# Patient Record
Sex: FEMALE | Race: WHITE | NOT HISPANIC OR LATINO
[De-identification: names, ages, dates, MRNs, and addresses within clinical notes are randomized per-mention and may not be internally consistent; named-entity substitution may affect disease eponyms.]

---

## 2021-10-04 PROBLEM — Z00.00 ENCOUNTER FOR PREVENTIVE HEALTH EXAMINATION: Status: ACTIVE | Noted: 2021-10-04

## 2021-10-08 ENCOUNTER — NON-APPOINTMENT (OUTPATIENT)
Age: 41
End: 2021-10-08

## 2021-10-11 ENCOUNTER — APPOINTMENT (OUTPATIENT)
Dept: BREAST CENTER | Facility: CLINIC | Age: 41
End: 2021-10-11
Payer: COMMERCIAL

## 2021-10-11 VITALS — WEIGHT: 150 LBS | HEIGHT: 62.5 IN | BODY MASS INDEX: 26.91 KG/M2

## 2021-10-11 DIAGNOSIS — Z15.01 GENETIC SUSCEPTIBILITY TO MALIGNANT NEOPLASM OF BREAST: ICD-10-CM

## 2021-10-11 DIAGNOSIS — Z80.1 FAMILY HISTORY OF MALIGNANT NEOPLASM OF TRACHEA, BRONCHUS AND LUNG: ICD-10-CM

## 2021-10-11 DIAGNOSIS — Z80.41 FAMILY HISTORY OF MALIGNANT NEOPLASM OF OVARY: ICD-10-CM

## 2021-10-11 DIAGNOSIS — Z78.9 OTHER SPECIFIED HEALTH STATUS: ICD-10-CM

## 2021-10-11 DIAGNOSIS — Z95.1 PRESENCE OF AORTOCORONARY BYPASS GRAFT: ICD-10-CM

## 2021-10-11 DIAGNOSIS — Z15.09 GENETIC SUSCEPTIBILITY TO MALIGNANT NEOPLASM OF BREAST: ICD-10-CM

## 2021-10-11 DIAGNOSIS — R92.8 OTHER ABNORMAL AND INCONCLUSIVE FINDINGS ON DIAGNOSTIC IMAGING OF BREAST: ICD-10-CM

## 2021-10-11 PROCEDURE — 99203 OFFICE O/P NEW LOW 30 MIN: CPT

## 2021-10-11 RX ORDER — ESCITALOPRAM OXALATE 10 MG/1
10 TABLET, FILM COATED ORAL
Refills: 0 | Status: ACTIVE | COMMUNITY

## 2021-10-11 RX ORDER — SPIRONOLACTONE 50 MG/1
TABLET ORAL
Refills: 0 | Status: ACTIVE | COMMUNITY

## 2021-10-11 RX ORDER — PROGESTERONE 50 MG/ML
INJECTION, SOLUTION INTRAMUSCULAR
Refills: 0 | Status: ACTIVE | COMMUNITY

## 2021-10-11 RX ORDER — ESTRADIOL VALERATE 40 MG/ML
INJECTION INTRAMUSCULAR
Refills: 0 | Status: ACTIVE | COMMUNITY

## 2021-10-11 RX ORDER — CLONAZEPAM 1 MG/1
1 TABLET ORAL
Refills: 0 | Status: ACTIVE | COMMUNITY

## 2021-11-12 ENCOUNTER — NON-APPOINTMENT (OUTPATIENT)
Age: 41
End: 2021-11-12

## 2021-12-09 ENCOUNTER — NON-APPOINTMENT (OUTPATIENT)
Age: 41
End: 2021-12-09

## 2021-12-13 ENCOUNTER — NON-APPOINTMENT (OUTPATIENT)
Age: 41
End: 2021-12-13

## 2021-12-13 ENCOUNTER — APPOINTMENT (OUTPATIENT)
Dept: BREAST CENTER | Facility: CLINIC | Age: 41
End: 2021-12-13

## 2022-01-20 ENCOUNTER — APPOINTMENT (OUTPATIENT)
Dept: BREAST CENTER | Facility: CLINIC | Age: 42
End: 2022-01-20
Payer: COMMERCIAL

## 2022-01-20 PROCEDURE — 99213 OFFICE O/P EST LOW 20 MIN: CPT | Mod: 95

## 2022-01-20 NOTE — PAST MEDICAL HISTORY
[Menstruating] : The patient is menstruating [Menarche Age ____] : age at menarche was [unfilled] [History of Hormone Replacement Treatment] : has a history of hormone replacement treatment [Total Preg ___] : G[unfilled] [Live Births ___] : P[unfilled]  [FreeTextEntry5] : 2018; prophylactic salpingo-oophorectomy [FreeTextEntry6] : None [FreeTextEntry7] : None [FreeTextEntry8] : Yes, the 2nd child

## 2022-01-20 NOTE — DATA REVIEWED
[FreeTextEntry1] : 5/20/21 ([facility]) MRI breasts: Left breast 2:00 rec MRI biopsy. Left 10::00, rec 6-month f/u MRI if above biopsy is benign. Right 11:00 0.6cm mass, probably benign, rec 6-month f/u MRI to confirm stability. BI-RADS 4. \par \par 5/27/21 ([facility]) MRI biopsy left breast 2n7: path revealed duct ectasia, perilobular inflammation, benign and concordant. Rec 6-month f/u MRI for post biopsy follow up and for f/u of additional findings as per MRI report dated 5/20/21

## 2022-01-20 NOTE — DATA REVIEWED
[FreeTextEntry1] : 5/20/21 ([facility]) MRI breasts: Left breast 2:00 rec MRI biopsy. Left 10::00, rec 6-month f/u MRI if above biopsy is benign. Right 11:00 0.6cm mass, probably benign, rec 6-month f/u MRI to confirm stability. BI-RADS 4. \par \par 5/27/21 ([facility]) MRI biopsy left breast 2n7: path revealed duct ectasia, perilobular inflammation, benign and concordant. Rec 6-month f/u MRI for post biopsy follow up and for f/u of additional findings as per MRI report dated 5/20/21\par \par Left retroareolar FNA-showed debris at [facility],as per patient

## 2022-01-20 NOTE — HISTORY OF PRESENT ILLNESS
[FreeTextEntry1] : 40 yo BRCA1+ female referred by Dr. Hanson to discuss prophylactic mastectomy; she is planning on a b/l ARIELLA flap. The patient found our that she was BRCA1+ in 2008, after her mother was diagnosed with ovarian cancer and tested +for BRCA1. The patient is s/p b/l salpingo-oophorectomy in 2018. She is followed by Dr. Lawson (med onc), who manages her imaging and screening. Pt denies palpable masses, skin lesions/changes, nipple discharge, or other breast complaints.\par \par Patient ruptured multiple ligaments after a climbing wall accident and has had surgery. Breast surgery will be postponed until she's healed.\par \par History of a bicuspid aortic valve; followed by cardiology (Dr. Ruiz at [facility])\par \par

## 2022-01-20 NOTE — HISTORY OF PRESENT ILLNESS
[FreeTextEntry1] : 40 yo BRCA1+ female referred by Dr. Hanson to discuss prophylactic mastectomy; she is planning on a b/l ARIELLA flap. The patient found our that she was BRCA1+ in 2008, after her mother was diagnosed with ovarian cancer and tested +for BRCA1. The patient is s/p b/l salpingo-oophorectomy in 2018. She is followed by Dr. Lawson (med onc), who manages her imaging and screening. Pt denies palpable masses, skin lesions/changes, nipple discharge, or other breast complaints.\par \par She reports that she completed a MRI in May 2021 followed by a biopsy with benign results; states that her last screening mmg was in October 2020. \par \par History of a bicuspid aortic valve; followed by cardiology (Dr. Ruiz at [facility])\par \par Discussed risk reducing options. This includes medical oncology consult and possible medication that would lower risk by 50%. As well as risk reducing surgery, discussed the risks and benefits of risk reducing breast surgery including the decrease of breast cancer risk to ~3-5%, length of recover and wound healing complications. Patient understands and would like to move forward with bilateral mastectomy and ARIELLA flap recon by Dr. Hanson in Jan 2022.\par \par

## 2022-05-10 ENCOUNTER — APPOINTMENT (OUTPATIENT)
Dept: BREAST CENTER | Facility: CLINIC | Age: 42
End: 2022-05-10

## 2022-05-25 ENCOUNTER — NON-APPOINTMENT (OUTPATIENT)
Age: 42
End: 2022-05-25

## 2022-06-20 ENCOUNTER — NON-APPOINTMENT (OUTPATIENT)
Age: 42
End: 2022-06-20

## 2022-07-06 ENCOUNTER — APPOINTMENT (OUTPATIENT)
Dept: BREAST CENTER | Facility: CLINIC | Age: 42
End: 2022-07-06

## 2022-07-06 VITALS
WEIGHT: 185.13 LBS | SYSTOLIC BLOOD PRESSURE: 133 MMHG | DIASTOLIC BLOOD PRESSURE: 85 MMHG | HEIGHT: 62.5 IN | HEART RATE: 67 BPM | BODY MASS INDEX: 33.21 KG/M2

## 2022-07-06 DIAGNOSIS — Z87.74 PERSONAL HISTORY OF (CORRECTED) CONGENITAL MALFORMATIONS OF HEART AND CIRCULATORY SYSTEM: ICD-10-CM

## 2022-07-06 PROCEDURE — 99213 OFFICE O/P EST LOW 20 MIN: CPT

## 2022-07-11 NOTE — DATA REVIEWED
[FreeTextEntry1] : 5/20/21 ([facility]) MRI breasts: Left breast 2:00 rec MRI biopsy. Left 10::00, rec 6-month f/u MRI if above biopsy is benign. Right 11:00 0.6cm mass, probably benign, rec 6-month f/u MRI to confirm stability. BI-RADS 4. \par \par 5/27/21 ([facility]) MRI biopsy left breast 2n7: path revealed duct ectasia, perilobular inflammation, benign and concordant. Rec 6-month f/u MRI for post biopsy follow up and for f/u of additional findings as per MRI report dated 5/20/21\par \par 11/1/2021 ([facility]) bilateral mammogram/US showing scattered areas of fibroglandular density, two clips left breast, no mammographic evidence of malignancy. Bilateral breast US: possible intraductal filling defect right retroareolar region for which targeted US recommended. BIRADS 0 \par \par Left retroareolar FNA-showed debris at [facility],as per patient\par \par 5/20/2022 ([facility]) bilateral breast MRI showing no MRI evidence of breast malignancy. Benign BIRADS 2

## 2022-07-11 NOTE — PAST MEDICAL HISTORY
[Menarche Age ____] : age at menarche was [unfilled] [History of Hormone Replacement Treatment] : has a history of hormone replacement treatment [Total Preg ___] : G[unfilled] [Live Births ___] : P[unfilled]  [Age At Live Birth ___] : Age at live birth: [unfilled] [FreeTextEntry5] : 2018; prophylactic salpingo-oophorectomy [FreeTextEntry6] : None [FreeTextEntry7] : None [FreeTextEntry8] : Yes, the 2nd child

## 2022-07-11 NOTE — HISTORY OF PRESENT ILLNESS
[FreeTextEntry1] : 41 yo BRCA1+ female presents for high risk screening. The patient found our that she was BRCA1+ in 2008, after her mother was diagnosed with ovarian cancer and tested +for BRCA1. The patient is s/p b/l salpingo-oophorectomy in 2018. She is followed by Dr. Lawson (med onc), who manages her imaging and screening. Pt denies palpable masses, skin lesions/changes, nipple discharge, or other breast complaints. She is interested in prophylactic mastectomy with ARIELLA flap reconstruction; she had previously consulted with Dr. Hanson.\par \par Patient ruptured multiple ligaments after a climbing wall accident and has had surgery. Her mobility is improving, she is undergoing physical therapy and has recently started to be able to run. Breast surgery will be postponed until she's healed.\par \par History of a bicuspid aortic valve; followed by cardiology (Dr. Ruiz at [facility])

## 2022-11-29 ENCOUNTER — APPOINTMENT (OUTPATIENT)
Dept: BREAST CENTER | Facility: CLINIC | Age: 42
End: 2022-11-29

## 2023-04-12 ENCOUNTER — TRANSCRIPTION ENCOUNTER (OUTPATIENT)
Age: 43
End: 2023-04-12

## 2023-04-12 VITALS
TEMPERATURE: 97 F | WEIGHT: 187.61 LBS | HEIGHT: 63 IN | SYSTOLIC BLOOD PRESSURE: 142 MMHG | RESPIRATION RATE: 16 BRPM | DIASTOLIC BLOOD PRESSURE: 85 MMHG | HEART RATE: 64 BPM | OXYGEN SATURATION: 100 %

## 2023-04-12 NOTE — PATIENT PROFILE ADULT - FALL HARM RISK - UNIVERSAL INTERVENTIONS
Bed in lowest position, wheels locked, appropriate side rails in place/Call bell, personal items and telephone in reach/Instruct patient to call for assistance before getting out of bed or chair/Non-slip footwear when patient is out of bed/[place] to call system/Physically safe environment - no spills, clutter or unnecessary equipment/Purposeful Proactive Rounding/Room/bathroom lighting operational, light cord in reach

## 2023-04-12 NOTE — PRE-OP CHECKLIST - PATIENT'S PERSONAL PROPERTY GIVEN TO
family member/on unit 2 bags sda, valuables w family/family member/on unit 2 bags sda, passport w marilyn; phone w security/family member/on unit/security/safe

## 2023-04-13 ENCOUNTER — TRANSCRIPTION ENCOUNTER (OUTPATIENT)
Age: 43
End: 2023-04-13

## 2023-04-13 ENCOUNTER — INPATIENT (INPATIENT)
Facility: HOSPITAL | Age: 43
LOS: 2 days | Discharge: HOME | DRG: 581 | End: 2023-04-16
Attending: PLASTIC SURGERY | Admitting: PLASTIC SURGERY
Payer: COMMERCIAL

## 2023-04-13 DIAGNOSIS — Z41.9 ENCOUNTER FOR PROCEDURE FOR PURPOSES OTHER THAN REMEDYING HEALTH STATE, UNSPECIFIED: Chronic | ICD-10-CM

## 2023-04-13 DIAGNOSIS — Z98.890 OTHER SPECIFIED POSTPROCEDURAL STATES: Chronic | ICD-10-CM

## 2023-04-13 DIAGNOSIS — Z98.891 HISTORY OF UTERINE SCAR FROM PREVIOUS SURGERY: Chronic | ICD-10-CM

## 2023-04-13 LAB
ANION GAP SERPL CALC-SCNC: 8 MMOL/L — SIGNIFICANT CHANGE UP (ref 5–17)
APTT BLD: 35.7 SEC — HIGH (ref 27.5–35.5)
BUN SERPL-MCNC: 16 MG/DL — SIGNIFICANT CHANGE UP (ref 7–23)
CALCIUM SERPL-MCNC: 9.2 MG/DL — SIGNIFICANT CHANGE UP (ref 8.4–10.5)
CHLORIDE SERPL-SCNC: 103 MMOL/L — SIGNIFICANT CHANGE UP (ref 96–108)
CO2 SERPL-SCNC: 26 MMOL/L — SIGNIFICANT CHANGE UP (ref 22–31)
CREAT SERPL-MCNC: 1.02 MG/DL — SIGNIFICANT CHANGE UP (ref 0.5–1.3)
EGFR: 71 ML/MIN/1.73M2 — SIGNIFICANT CHANGE UP
GLUCOSE SERPL-MCNC: 123 MG/DL — HIGH (ref 70–99)
HCT VFR BLD CALC: 41.5 % — SIGNIFICANT CHANGE UP (ref 34.5–45)
HGB BLD-MCNC: 13.7 G/DL — SIGNIFICANT CHANGE UP (ref 11.5–15.5)
INR BLD: 0.97 — SIGNIFICANT CHANGE UP (ref 0.88–1.16)
MCHC RBC-ENTMCNC: 30.3 PG — SIGNIFICANT CHANGE UP (ref 27–34)
MCHC RBC-ENTMCNC: 33 GM/DL — SIGNIFICANT CHANGE UP (ref 32–36)
MCV RBC AUTO: 91.8 FL — SIGNIFICANT CHANGE UP (ref 80–100)
NRBC # BLD: 0 /100 WBCS — SIGNIFICANT CHANGE UP (ref 0–0)
PLATELET # BLD AUTO: 256 K/UL — SIGNIFICANT CHANGE UP (ref 150–400)
POTASSIUM SERPL-MCNC: 4.4 MMOL/L — SIGNIFICANT CHANGE UP (ref 3.5–5.3)
POTASSIUM SERPL-SCNC: 4.4 MMOL/L — SIGNIFICANT CHANGE UP (ref 3.5–5.3)
PROTHROM AB SERPL-ACNC: 11.5 SEC — SIGNIFICANT CHANGE UP (ref 10.5–13.4)
RBC # BLD: 4.52 M/UL — SIGNIFICANT CHANGE UP (ref 3.8–5.2)
RBC # FLD: 12.4 % — SIGNIFICANT CHANGE UP (ref 10.3–14.5)
SODIUM SERPL-SCNC: 137 MMOL/L — SIGNIFICANT CHANGE UP (ref 135–145)
WBC # BLD: 7.42 K/UL — SIGNIFICANT CHANGE UP (ref 3.8–10.5)
WBC # FLD AUTO: 7.42 K/UL — SIGNIFICANT CHANGE UP (ref 3.8–10.5)

## 2023-04-13 PROCEDURE — 64912 NRV RPR W/NRV ALGRFT 1ST: CPT | Mod: RT

## 2023-04-13 PROCEDURE — S2068: CPT | Mod: RT

## 2023-04-13 PROCEDURE — 88305 TISSUE EXAM BY PATHOLOGIST: CPT | Mod: 26

## 2023-04-13 PROCEDURE — 88307 TISSUE EXAM BY PATHOLOGIST: CPT | Mod: 26

## 2023-04-13 PROCEDURE — 88300 SURGICAL PATH GROSS: CPT | Mod: 26,59

## 2023-04-13 PROCEDURE — 38530 BIOPSY/REMOVAL LYMPH NODES: CPT | Mod: 50,80

## 2023-04-13 PROCEDURE — 15777 ACELLULAR DERM MATRIX IMPLT: CPT | Mod: RT

## 2023-04-13 PROCEDURE — 21600 PARTIAL REMOVAL OF RIB: CPT | Mod: 80,LT,59

## 2023-04-13 DEVICE — MESH PHASIX ST 10X15CM: Type: IMPLANTABLE DEVICE | Site: BILATERAL | Status: FUNCTIONAL

## 2023-04-13 DEVICE — COUPLER VESSEL ANASTOMOTIC 2.5MM: Type: IMPLANTABLE DEVICE | Site: BILATERAL | Status: FUNCTIONAL

## 2023-04-13 DEVICE — CARTRIDGE MICROCLIP 30: Type: IMPLANTABLE DEVICE | Site: BILATERAL | Status: FUNCTIONAL

## 2023-04-13 DEVICE — CLIP APPLIER ETHICON LIGACLIP 9 3/8" MEDIUM: Type: IMPLANTABLE DEVICE | Site: BILATERAL | Status: FUNCTIONAL

## 2023-04-13 DEVICE — CLIP APPLIER ETHICON LIGACLIP 11.5" MEDIUM: Type: IMPLANTABLE DEVICE | Site: BILATERAL | Status: FUNCTIONAL

## 2023-04-13 DEVICE — CLIP APPLIER ETHICON LIGACLIP 9 3/8" SMALL: Type: IMPLANTABLE DEVICE | Site: BILATERAL | Status: FUNCTIONAL

## 2023-04-13 DEVICE — DOPPLER PROBE DISPOSABLE: Type: IMPLANTABLE DEVICE | Site: BILATERAL | Status: FUNCTIONAL

## 2023-04-13 DEVICE — LIGATING CLIPS SYNOVIS SUPERFINE MICROCLIP 6: Type: IMPLANTABLE DEVICE | Site: BILATERAL | Status: FUNCTIONAL

## 2023-04-13 DEVICE — GRAFT NERVE CONNECTOR 2X10MM: Type: IMPLANTABLE DEVICE | Site: BILATERAL | Status: FUNCTIONAL

## 2023-04-13 DEVICE — COUPLER VESSEL ANASTOMOTIC 3MM: Type: IMPLANTABLE DEVICE | Site: BILATERAL | Status: FUNCTIONAL

## 2023-04-13 RX ORDER — HYDROMORPHONE HYDROCHLORIDE 2 MG/ML
0.5 INJECTION INTRAMUSCULAR; INTRAVENOUS; SUBCUTANEOUS
Refills: 0 | Status: DISCONTINUED | OUTPATIENT
Start: 2023-04-13 | End: 2023-04-16

## 2023-04-13 RX ORDER — OXYCODONE HYDROCHLORIDE 5 MG/1
10 TABLET ORAL EVERY 4 HOURS
Refills: 0 | Status: DISCONTINUED | OUTPATIENT
Start: 2023-04-13 | End: 2023-04-16

## 2023-04-13 RX ORDER — ENOXAPARIN SODIUM 100 MG/ML
40 INJECTION SUBCUTANEOUS EVERY 24 HOURS
Refills: 0 | Status: DISCONTINUED | OUTPATIENT
Start: 2023-04-14 | End: 2023-04-16

## 2023-04-13 RX ORDER — SPIRONOLACTONE 25 MG/1
1 TABLET, FILM COATED ORAL
Refills: 0 | DISCHARGE

## 2023-04-13 RX ORDER — LAMOTRIGINE 25 MG/1
1 TABLET, ORALLY DISINTEGRATING ORAL
Refills: 0 | DISCHARGE

## 2023-04-13 RX ORDER — ESCITALOPRAM OXALATE 10 MG/1
1 TABLET, FILM COATED ORAL
Refills: 0 | DISCHARGE

## 2023-04-13 RX ORDER — ENOXAPARIN SODIUM 100 MG/ML
40 INJECTION SUBCUTANEOUS ONCE
Refills: 0 | Status: COMPLETED | OUTPATIENT
Start: 2023-04-13 | End: 2023-04-13

## 2023-04-13 RX ORDER — HYDROMORPHONE HYDROCHLORIDE 2 MG/ML
1 INJECTION INTRAMUSCULAR; INTRAVENOUS; SUBCUTANEOUS EVERY 4 HOURS
Refills: 0 | Status: DISCONTINUED | OUTPATIENT
Start: 2023-04-13 | End: 2023-04-16

## 2023-04-13 RX ORDER — SODIUM CHLORIDE 9 MG/ML
1000 INJECTION, SOLUTION INTRAVENOUS
Refills: 0 | Status: DISCONTINUED | OUTPATIENT
Start: 2023-04-13 | End: 2023-04-14

## 2023-04-13 RX ORDER — OXYCODONE HYDROCHLORIDE 5 MG/1
5 TABLET ORAL EVERY 4 HOURS
Refills: 0 | Status: DISCONTINUED | OUTPATIENT
Start: 2023-04-13 | End: 2023-04-16

## 2023-04-13 RX ORDER — DIAZEPAM 5 MG
5 TABLET ORAL EVERY 8 HOURS
Refills: 0 | Status: DISCONTINUED | OUTPATIENT
Start: 2023-04-13 | End: 2023-04-16

## 2023-04-13 RX ORDER — ACETAMINOPHEN 500 MG
975 TABLET ORAL EVERY 8 HOURS
Refills: 0 | Status: DISCONTINUED | OUTPATIENT
Start: 2023-04-13 | End: 2023-04-16

## 2023-04-13 RX ORDER — SENNA PLUS 8.6 MG/1
2 TABLET ORAL AT BEDTIME
Refills: 0 | Status: DISCONTINUED | OUTPATIENT
Start: 2023-04-13 | End: 2023-04-16

## 2023-04-13 RX ORDER — METOCLOPRAMIDE HCL 10 MG
10 TABLET ORAL EVERY 6 HOURS
Refills: 0 | Status: DISCONTINUED | OUTPATIENT
Start: 2023-04-13 | End: 2023-04-16

## 2023-04-13 RX ORDER — ONDANSETRON 8 MG/1
4 TABLET, FILM COATED ORAL EVERY 6 HOURS
Refills: 0 | Status: DISCONTINUED | OUTPATIENT
Start: 2023-04-13 | End: 2023-04-16

## 2023-04-13 RX ADMIN — ENOXAPARIN SODIUM 40 MILLIGRAM(S): 100 INJECTION SUBCUTANEOUS at 07:07

## 2023-04-13 RX ADMIN — Medication 975 MILLIGRAM(S): at 22:18

## 2023-04-13 RX ADMIN — Medication 100 MILLIGRAM(S): at 19:43

## 2023-04-13 NOTE — PROGRESS NOTE ADULT - SUBJECTIVE AND OBJECTIVE BOX
Team 5 Surgery Post op Check    Procedure: Mastectomy, bilateral, skin sparing  Mastectomy, with reconstruction using ARIELLA free flap  Surgeon: Dr. Dorantes and Dr. Yan    Subjective:  Patient seen and examined at bedside in PACU. Vital signs reviewed and noted to be stable. Patient reports she feels tired after surgery, but pain is controlled. Denies fevers, chills, chest pain, SOB, nausea, emesis. States she does have some pain in abdomen.     Vital Signs Last 24 Hrs  T(C): 36.4 (13 Apr 2023 17:28), Max: 36.4 (13 Apr 2023 17:28)  T(F): 97.6 (13 Apr 2023 17:28), Max: 97.6 (13 Apr 2023 17:28)  HR: 82 (13 Apr 2023 18:43) (64 - 82)  BP: 119/56 (13 Apr 2023 18:43) (100/55 - 142/85)  BP(mean): 81 (13 Apr 2023 18:43) (75 - 81)  RR: 14 (13 Apr 2023 18:43) (12 - 16)  SpO2: 100% (13 Apr 2023 18:43) (96% - 100%)    Parameters below as of 13 Apr 2023 18:43  Patient On (Oxygen Delivery Method): nasal cannula        Physical Exam:  General: NAD, resting comfortably in bed  Pulmonary: Nonlabored breathing, no respiratory distress, saturating well on NC  Cardiovascular: NSR  Breast: compressive bra in place, breast soft, no evidence of hematoma. JUAN ANTONIO x 4 noted to be SS. Doppler signals present  Abdominal: soft, nondistended, lower abdominal incision CDI with steri strips. Moderate tenderness to palpation  Extremities: WWP, normal strength, No edema  Neuro: A/O x 3, CNs II-XII grossly intact, no focal deficits, normal motor/sensation      LABS:                        13.7   7.42  )-----------( 256      ( 13 Apr 2023 07:38 )             41.5     04-13    137  |  103  |  16  ----------------------------<  123<H>  4.4   |  26  |  1.02    Ca    9.2      13 Apr 2023 07:38      PT/INR - ( 13 Apr 2023 07:38 )   PT: 11.5 sec;   INR: 0.97          PTT - ( 13 Apr 2023 07:38 )  PTT:35.7 sec  CAPILLARY BLOOD GLUCOSE            ABO Interpretation: A (04-13 @ 08:13)        Radiology and Additional Studies:

## 2023-04-13 NOTE — PRE-ANESTHESIA EVALUATION ADULT - NSANTHPEFT_GEN_ALL_CORE
Alert and Oriented X 3 in no acute distress.  Heart: RRR  Abd: Soft NDNT  Extremities: gross motor function intact

## 2023-04-13 NOTE — BRIEF OPERATIVE NOTE - NSICDXBRIEFPOSTOP_GEN_ALL_CORE_FT
POST-OP DIAGNOSIS:  BRCA1 positive 13-Apr-2023 11:12:10  Danial Bledsoe  
POST-OP DIAGNOSIS:  BRCA1 positive 13-Apr-2023 11:12:10  Danial Bledsoe

## 2023-04-13 NOTE — BRIEF OPERATIVE NOTE - OPERATION/FINDINGS
Periareolar nipple and vertical inferior incision made with 15blade. Flaps raised superiorly to clavicle, medially to sternum, inferiorly to IMF, laterally to border of latissimus dorsi, and posteriorly to pec major fascia using electrocautery and sharp dissection. Breast tissue removed and marked. ARIELLA flap reconstruction to continue per plastics surgery.
bilateral breast reconstruction with ARIELLA flaps, nerve grafts, abdominal repair with mesh.

## 2023-04-13 NOTE — PRE-ANESTHESIA EVALUATION ADULT - NSANTHPMHFT_GEN_ALL_CORE
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  Pt follows cardiologist. Denies any chest pain, shortness of breath, or edema.

## 2023-04-13 NOTE — PROGRESS NOTE ADULT - ASSESSMENT
Assessment:42y Female s/p above procedure    Plan:  NPO w/ sips  Pain/nausea control PRN  Home meds  Monitor JUAN ANTONIO output  Monitor doppler  Rest of care per Plastics

## 2023-04-13 NOTE — BRIEF OPERATIVE NOTE - NSICDXBRIEFPREOP_GEN_ALL_CORE_FT
PRE-OP DIAGNOSIS:  BRCA1 positive 13-Apr-2023 11:11:57  Danial Bledsoe  
PRE-OP DIAGNOSIS:  BRCA1 positive 13-Apr-2023 11:11:57  Danial Bledsoe

## 2023-04-13 NOTE — BRIEF OPERATIVE NOTE - NSICDXBRIEFPROCEDURE_GEN_ALL_CORE_FT
PROCEDURES:  Mastectomy, with reconstruction using ARIELLA free flap 13-Apr-2023 11:11:21  Danial Bledsoe  
PROCEDURES:  Mastectomy, with reconstruction using ARIELLA free flap 13-Apr-2023 11:11:21  Danial Bledsoe

## 2023-04-13 NOTE — PRE-ANESTHESIA EVALUATION ADULT - NSPROPOSEDPROCEDFT_GEN_ALL_CORE
Bilateral Skin Sparing Prophylactic Mastectomy with Immediate Bilateral Autologous ARIELLA Reconstruction

## 2023-04-14 ENCOUNTER — TRANSCRIPTION ENCOUNTER (OUTPATIENT)
Age: 43
End: 2023-04-14

## 2023-04-14 LAB
ANION GAP SERPL CALC-SCNC: 9 MMOL/L — SIGNIFICANT CHANGE UP (ref 5–17)
BUN SERPL-MCNC: 12 MG/DL — SIGNIFICANT CHANGE UP (ref 7–23)
CALCIUM SERPL-MCNC: 8.2 MG/DL — LOW (ref 8.4–10.5)
CHLORIDE SERPL-SCNC: 102 MMOL/L — SIGNIFICANT CHANGE UP (ref 96–108)
CO2 SERPL-SCNC: 25 MMOL/L — SIGNIFICANT CHANGE UP (ref 22–31)
CREAT SERPL-MCNC: 0.98 MG/DL — SIGNIFICANT CHANGE UP (ref 0.5–1.3)
EGFR: 74 ML/MIN/1.73M2 — SIGNIFICANT CHANGE UP
GLUCOSE SERPL-MCNC: 136 MG/DL — HIGH (ref 70–99)
HCT VFR BLD CALC: 29.6 % — LOW (ref 34.5–45)
HGB BLD-MCNC: 9.7 G/DL — LOW (ref 11.5–15.5)
MCHC RBC-ENTMCNC: 30.7 PG — SIGNIFICANT CHANGE UP (ref 27–34)
MCHC RBC-ENTMCNC: 32.8 GM/DL — SIGNIFICANT CHANGE UP (ref 32–36)
MCV RBC AUTO: 93.7 FL — SIGNIFICANT CHANGE UP (ref 80–100)
NRBC # BLD: 0 /100 WBCS — SIGNIFICANT CHANGE UP (ref 0–0)
PLATELET # BLD AUTO: 269 K/UL — SIGNIFICANT CHANGE UP (ref 150–400)
POTASSIUM SERPL-MCNC: 4.2 MMOL/L — SIGNIFICANT CHANGE UP (ref 3.5–5.3)
POTASSIUM SERPL-SCNC: 4.2 MMOL/L — SIGNIFICANT CHANGE UP (ref 3.5–5.3)
RBC # BLD: 3.16 M/UL — LOW (ref 3.8–5.2)
RBC # FLD: 12.8 % — SIGNIFICANT CHANGE UP (ref 10.3–14.5)
SODIUM SERPL-SCNC: 136 MMOL/L — SIGNIFICANT CHANGE UP (ref 135–145)
WBC # BLD: 13.85 K/UL — HIGH (ref 3.8–10.5)
WBC # FLD AUTO: 13.85 K/UL — HIGH (ref 3.8–10.5)

## 2023-04-14 RX ORDER — SODIUM CHLORIDE 9 MG/ML
1000 INJECTION, SOLUTION INTRAVENOUS
Refills: 0 | Status: DISCONTINUED | OUTPATIENT
Start: 2023-04-14 | End: 2023-04-14

## 2023-04-14 RX ORDER — LAMOTRIGINE 25 MG/1
100 TABLET, ORALLY DISINTEGRATING ORAL AT BEDTIME
Refills: 0 | Status: DISCONTINUED | OUTPATIENT
Start: 2023-04-14 | End: 2023-04-16

## 2023-04-14 RX ORDER — ESCITALOPRAM OXALATE 10 MG/1
20 TABLET, FILM COATED ORAL DAILY
Refills: 0 | Status: DISCONTINUED | OUTPATIENT
Start: 2023-04-14 | End: 2023-04-16

## 2023-04-14 RX ORDER — SPIRONOLACTONE 25 MG/1
50 TABLET, FILM COATED ORAL DAILY
Refills: 0 | Status: DISCONTINUED | OUTPATIENT
Start: 2023-04-14 | End: 2023-04-16

## 2023-04-14 RX ADMIN — LAMOTRIGINE 100 MILLIGRAM(S): 25 TABLET, ORALLY DISINTEGRATING ORAL at 21:43

## 2023-04-14 RX ADMIN — Medication 100 MILLIGRAM(S): at 02:42

## 2023-04-14 RX ADMIN — ESCITALOPRAM OXALATE 20 MILLIGRAM(S): 10 TABLET, FILM COATED ORAL at 21:43

## 2023-04-14 RX ADMIN — Medication 975 MILLIGRAM(S): at 13:53

## 2023-04-14 RX ADMIN — Medication 100 MILLIGRAM(S): at 10:53

## 2023-04-14 RX ADMIN — SODIUM CHLORIDE 100 MILLILITER(S): 9 INJECTION, SOLUTION INTRAVENOUS at 08:00

## 2023-04-14 RX ADMIN — SODIUM CHLORIDE 125 MILLILITER(S): 9 INJECTION, SOLUTION INTRAVENOUS at 05:36

## 2023-04-14 RX ADMIN — Medication 975 MILLIGRAM(S): at 06:15

## 2023-04-14 RX ADMIN — OXYCODONE HYDROCHLORIDE 5 MILLIGRAM(S): 5 TABLET ORAL at 21:43

## 2023-04-14 RX ADMIN — OXYCODONE HYDROCHLORIDE 5 MILLIGRAM(S): 5 TABLET ORAL at 16:00

## 2023-04-14 RX ADMIN — ENOXAPARIN SODIUM 40 MILLIGRAM(S): 100 INJECTION SUBCUTANEOUS at 06:15

## 2023-04-14 RX ADMIN — OXYCODONE HYDROCHLORIDE 5 MILLIGRAM(S): 5 TABLET ORAL at 15:04

## 2023-04-14 RX ADMIN — Medication 975 MILLIGRAM(S): at 20:39

## 2023-04-14 RX ADMIN — OXYCODONE HYDROCHLORIDE 5 MILLIGRAM(S): 5 TABLET ORAL at 22:43

## 2023-04-14 NOTE — DISCHARGE NOTE PROVIDER - NSDCMRMEDTOKEN_GEN_ALL_CORE_FT
hormone therapy:   lamoTRIgine 100 mg oral tablet: 1 orally once a day (at bedtime)  Lexapro 20 mg oral tablet: 1 orally once a day (at bedtime)  spironolactone 50 mg oral tablet: 1 orally once a day (at bedtime)

## 2023-04-14 NOTE — PHYSICAL THERAPY INITIAL EVALUATION ADULT - PHYSICAL ASSIST/NONPHYSICAL ASSIST: SUPINE/SIT, REHAB EVAL
able to bring B/L LEs to EOB; *increased assist for trunk mobility/verbal cues/nonverbal cues (demo/gestures)/1 person assist

## 2023-04-14 NOTE — PROGRESS NOTE ADULT - ASSESSMENT
89 s/p bilateral mastectomies and bilateral ARIELLA flap reconstruction 4/13/2023. Recovering appropriately.  - q2h flap checks  - remove Estrada  - remove telemetry leads  - clear liquid diet  - LR -> D5 1/2 NS  - ambulate with assistance  - PRN and standing pain control  - VTE ppx    Willi Tucker PGY6  Plastic Surgery  Pager: [phone]

## 2023-04-14 NOTE — DISCHARGE NOTE PROVIDER - NSDCCPCAREPLAN_GEN_ALL_CORE_FT
PRINCIPAL DISCHARGE DIAGNOSIS  Diagnosis: Breast cancer  Assessment and Plan of Treatment:      PRINCIPAL DISCHARGE DIAGNOSIS  Diagnosis: BRCA gene positive  Assessment and Plan of Treatment:

## 2023-04-14 NOTE — PHYSICAL THERAPY INITIAL EVALUATION ADULT - DID THE PATIENT HAVE SURGERY?
Mastectomy, with reconstruction using ARIELLA free flap; bilateral breast reconstruction with ARIELLA flaps, nerve grafts, abdominal repair with mesh./yes

## 2023-04-14 NOTE — PROVIDER CONTACT NOTE (OTHER) - ACTION/TREATMENT ORDERED:
No intervention ordered. Regular diet will reportedly start later, patient to start on clears in the meantime. Endorsed to dayshift RN

## 2023-04-14 NOTE — DISCHARGE NOTE PROVIDER - PROVIDER TOKENS
PROVIDER:[TOKEN:[[id number]]] PROVIDER:[TOKEN:[[id number]]],PROVIDER:[TOKEN:[[id number]]]

## 2023-04-14 NOTE — DISCHARGE NOTE PROVIDER - NSDCCPTREATMENT_GEN_ALL_CORE_FT
PRINCIPAL PROCEDURE  Procedure: Bilateral mastectomy with breast reconstruction  Findings and Treatment: using ARIELLA flap

## 2023-04-14 NOTE — PROGRESS NOTE ADULT - SUBJECTIVE AND OBJECTIVE BOX
Plastic Surgery Progress Note  SUBJECTIVE  The patient was seen and examined this morning during rounds. No acute events overnight. Pain controlled.    OBJECTIVE  ___________________________________________________  VITAL SIGNS / I&O's   Vital Signs Last 24 Hrs  T(C): 36.7 (14 Apr 2023 06:16), Max: 37.3 (14 Apr 2023 03:29)  T(F): 98.1 (14 Apr 2023 06:16), Max: 99.2 (14 Apr 2023 03:29)  HR: 86 (14 Apr 2023 06:16) (64 - 89)  BP: 111/61 (14 Apr 2023 06:16) (100/55 - 142/85)  BP(mean): 86 (13 Apr 2023 20:30) (75 - 86)  RR: 18 (14 Apr 2023 06:16) (12 - 21)  SpO2: 96% (14 Apr 2023 06:16) (95% - 100%)    Parameters below as of 14 Apr 2023 06:16  Patient On (Oxygen Delivery Method): room air          13 Apr 2023 07:01  -  14 Apr 2023 07:00  --------------------------------------------------------  IN:    Lactated Ringers: 874 mL  Total IN: 874 mL    OUT:    Bulb (mL): 60 mL    Bulb (mL): 24.5 mL    Bulb (mL): 35 mL    Bulb (mL): 25.5 mL    Indwelling Catheter - Urethral (mL): 900 mL  Total OUT: 1045 mL    Total NET: -171 mL        ___________________________________________________  PHYSICAL EXAM    -- CONSTITUTIONAL: NAD, lying in bed  -- NEURO: Awake, alert  -- PULM: Non-labored respirations  -- BREAST: bilateral reconstructed breasts soft, no collection, incisions c/d/i, ARIELLA flap skin paddles soft, warm, appropriate color, good cap refill, strong Cook doppler signals. Drains serosanguinous.  -- ABDOMEN: soft, non-tender, non-distended, incision c/d/i no collections, drains serosanguinous    ___________________________________________________  LABS                        13.7   7.42  )-----------( 256      ( 13 Apr 2023 07:38 )             41.5     13 Apr 2023 07:38    137    |  103    |  16     ----------------------------<  123    4.4     |  26     |  1.02     Ca    9.2        13 Apr 2023 07:38      PT/INR - ( 13 Apr 2023 07:38 )   PT: 11.5 sec;   INR: 0.97          PTT - ( 13 Apr 2023 07:38 )  PTT:35.7 sec  CAPILLARY BLOOD GLUCOSE    ___________________________________________________  MEDICATIONS  (STANDING):  acetaminophen     Tablet .. 975 milliGRAM(s) Oral every 8 hours  clindamycin IVPB 900 milliGRAM(s) IV Intermittent every 8 hours  dextrose 5% + sodium chloride 0.45%. 1000 milliLiter(s) (100 mL/Hr) IV Continuous <Continuous>  enoxaparin Injectable 40 milliGRAM(s) SubCutaneous every 24 hours    MEDICATIONS  (PRN):  diazepam    Tablet 5 milliGRAM(s) Oral every 8 hours PRN muscle spasms  HYDROmorphone  Injectable 1 milliGRAM(s) IV Push every 4 hours PRN Moderate Pain (4 - 6)  HYDROmorphone  Injectable 0.5 milliGRAM(s) IV Push every 15 minutes PRN breakthrough pain  metoclopramide Injectable 10 milliGRAM(s) IV Push every 6 hours PRN Nausea and/or Vomiting  ondansetron Injectable 4 milliGRAM(s) IV Push every 6 hours PRN Nausea  oxyCODONE    IR 5 milliGRAM(s) Oral every 4 hours PRN Moderate Pain (4 - 6)  oxyCODONE    IR 10 milliGRAM(s) Oral every 4 hours PRN Severe Pain (7 - 10)  senna 2 Tablet(s) Oral at bedtime PRN Constipation

## 2023-04-14 NOTE — DISCHARGE NOTE PROVIDER - NSDCFUADDINST_GEN_ALL_CORE_FT
JUAN ANTONIO Drain Care:  *Please look at the site every day for signs of infection (increased redness or pain, swelling, odor, yellow or bloody discharge, warm to touch, fever).  *Maintain suction of the bulb.  *Note color, consistency, and amount of fluid in the drain. Call the doctor, nurse practitioner, or VNA nurse if the amount increases significantly or changes in character.  *Be sure to empty the drain frequently. Record the output, if instructed to do so.  *You may shower; wash the area gently with warm, soapy water.  *Keep the insertion site clean and dry otherwise.  *Avoid swimming, baths, hot tubs; do not submerge yourself in water.  *Make sure to keep the drain attached securely to your body to prevent pulling or dislocation.   *Please refer to the post-operative care instructions provided from Dr. Yan's office.    -Follow up with Plastic & Reconstructive Surgeon, Dr. Yan in 1 week in the office.   -Follow up with Breast Surgeon, Dr. Dorantes in 1-2 weeks in the office.    -Diet: no restrictions.     -Showers are permitted the day of discharge. The drains and the incision lines can get wet in the shower. Do not take a bath. Pin the drains to a bathrobe belt or string or a small towel draped over your neck in order that the drains do not dangle from your skin while in the shower. Keep incision sites and JUAN ANTONIO drain sites clean & dry after showering.     -No heavy lifting >20 pounds or strenuous exercises.     -Call Doctor’s office or return to ER if: fever (temperature >101.4F), chills, chest pain, shortness of breath, uncontrolled/severe pain, persistent nausea/vomiting, or bleeding/oozing/redness/swelling at incision sites.  	  -For routine questions, call the office ([phone]) weekdays 9:00 A.M. - 5:00 P.M.       JUAN ANTONIO Drain Care:  *Please look at the site every day for signs of infection (increased redness or pain, swelling, odor, yellow or bloody discharge, warm to touch, fever).  *Maintain suction of the bulb.  *Note color, consistency, and amount of fluid in the drain. Call the doctor, nurse practitioner, or VNA nurse if the amount increases significantly or changes in character.  *Be sure to empty the drain frequently. Record the output, if instructed to do so.  *You may shower; wash the area gently with warm, soapy water.  *Keep the insertion site clean and dry otherwise.  *Avoid swimming, baths, hot tubs; do not submerge yourself in water.  *Make sure to keep the drain attached securely to your body to prevent pulling or dislocation.

## 2023-04-14 NOTE — PROGRESS NOTE ADULT - SUBJECTIVE AND OBJECTIVE BOX
Seen at 715am and again now  Comfortable  AVSS  Expected Costa drain output  Bilateral DIEPs soft and viable w good doppler signals  Breast skin flaps viable  Donor site fine  Suturelines intact  No collections  Reviewed findings w pt  Care, restrictions and anticipated course outlined  All questions answered.  Plan reviewed w resident staff and RN

## 2023-04-14 NOTE — PHYSICAL THERAPY INITIAL EVALUATION ADULT - ADDITIONAL COMMENTS
Patient reports previously independent with all ADLs/IADLs prior to admission. No HHA. Denies history of mechanical falls within the past 6 months.

## 2023-04-14 NOTE — PROGRESS NOTE ADULT - ASSESSMENT
42F POD1 b/l skin sparing masectomy with skin sparing with reconstruction using ARIELLA free flap. Doing well post op.     Recs:  Assessment:42y Female s/p above procedure    Plan:  Diet per primary   Pain/nausea control PRN  Home meds  Monitor JUAN ANTONIO output  Monitor doppler  Rest of care per Plastics  Discussed with attending

## 2023-04-14 NOTE — PHYSICAL THERAPY INITIAL EVALUATION ADULT - PHYSICAL ASSIST/NONPHYSICAL ASSIST: SIT/SUPINE, REHAB EVAL
fair eccentric control onto (R)shoulder; increased assist to maintain truncal neutral for comfort and assist for B/L LEs onto bed surface (performed for family training with ;  verbalized understanding)/verbal cues/nonverbal cues (demo/gestures)/1 person assist

## 2023-04-14 NOTE — PHYSICAL THERAPY INITIAL EVALUATION ADULT - ACTIVE RANGE OF MOTION EXAMINATION, REHAB EVAL
Bilateral shoulder flexion/abduction greater than or equal to 90 degrees not tested secondary to mastectomy precautions/bilateral upper extremity Active ROM was WFL (within functional limits)/bilateral  lower extremity Active ROM was WFL (within functional limits)

## 2023-04-14 NOTE — PROVIDER CONTACT NOTE (OTHER) - SITUATION
Received a call from hematology lab around 7:30AM that patient's hemoglobin dropped from 13.7 to 9.7.

## 2023-04-14 NOTE — DISCHARGE NOTE PROVIDER - HOSPITAL COURSE
43 yo BRCA1+ female s/p b/l salpingo-oophorectomy in 2018 presenting for prophylactic mastectomy with ARIELLA flap reconstruction. Patient was brought to the operating room on 4/13/2022 for a mastectomy and ARIELLA flap reconstruction. Patient tolerated the procedure well with no known complications.  Patient is tolerating diet, pain is well controlled, ambulating and voiding.  In accordance with the attending the patient is stable for discharge and is to follow up as an outpatient.

## 2023-04-14 NOTE — PHYSICAL THERAPY INITIAL EVALUATION ADULT - THERAPY FREQUENCY, PT EVAL
Patient and  educated on frequency of inpatient physical therapy at [facility], both verbalized understanding./3-5x/week

## 2023-04-14 NOTE — DISCHARGE NOTE PROVIDER - CARE PROVIDER_API CALL
Roge Yan)  Plastic Surgery  [address] [place] [address]  Phone: [phone]  Fax: [phone]  Follow Up Time:    Roge Yan)  Plastic Surgery  [address] [facility] [address] [place] [address]  Phone: [phone]  Fax: [phone]  Follow Up Time:     Giselle Dorantes)  Surgery  [address] [address]  Phone: [phone]  Fax: [phone]  Follow Up Time:

## 2023-04-14 NOTE — PROGRESS NOTE ADULT - SUBJECTIVE AND OBJECTIVE BOX
SUBJECTIVE: Seen and evaluated in AM. NAEON. Resting comfortably, no acute complaints. Pain is controlled, no nausea, vomiting or BM but passing flatus.       MEDICATIONS  (STANDING):  acetaminophen     Tablet .. 975 milliGRAM(s) Oral every 8 hours  clindamycin IVPB 900 milliGRAM(s) IV Intermittent every 8 hours  dextrose 5% + sodium chloride 0.45%. 1000 milliLiter(s) (100 mL/Hr) IV Continuous <Continuous>  enoxaparin Injectable 40 milliGRAM(s) SubCutaneous every 24 hours    MEDICATIONS  (PRN):  diazepam    Tablet 5 milliGRAM(s) Oral every 8 hours PRN muscle spasms  HYDROmorphone  Injectable 1 milliGRAM(s) IV Push every 4 hours PRN Moderate Pain (4 - 6)  HYDROmorphone  Injectable 0.5 milliGRAM(s) IV Push every 15 minutes PRN breakthrough pain  metoclopramide Injectable 10 milliGRAM(s) IV Push every 6 hours PRN Nausea and/or Vomiting  ondansetron Injectable 4 milliGRAM(s) IV Push every 6 hours PRN Nausea  oxyCODONE    IR 5 milliGRAM(s) Oral every 4 hours PRN Moderate Pain (4 - 6)  oxyCODONE    IR 10 milliGRAM(s) Oral every 4 hours PRN Severe Pain (7 - 10)  senna 2 Tablet(s) Oral at bedtime PRN Constipation      Vital Signs Last 24 Hrs  T(C): 37.1 (14 Apr 2023 08:45), Max: 37.3 (14 Apr 2023 03:29)  T(F): 98.8 (14 Apr 2023 08:45), Max: 99.2 (14 Apr 2023 03:29)  HR: 89 (14 Apr 2023 08:45) (80 - 89)  BP: 110/60 (14 Apr 2023 08:45) (100/55 - 132/62)  BP(mean): 86 (13 Apr 2023 20:30) (75 - 86)  RR: 17 (14 Apr 2023 08:45) (12 - 21)  SpO2: 96% (14 Apr 2023 08:45) (95% - 100%)    Parameters below as of 14 Apr 2023 08:45  Patient On (Oxygen Delivery Method): room air    Physical Exam:  General: NAD, resting comfortably in bed  Pulmonary: Nonlabored breathing, no respiratory distress, saturating well on NC  Cardiovascular: NSR  Breast: compressive bra in place, breast soft, no evidence of hematoma. JUAN ANTONIO x 4 noted to be SS.   Abdominal: soft, nondistended, lower abdominal incision CDI with steri strips. Moderate tenderness to palpation  Extremities: WWP, normal strength, No edema  Neuro: A/O x 3, CNs II-XII grossly intact, no focal deficits, normal motor/sensation    I&O's Summary    13 Apr 2023 07:01  -  14 Apr 2023 07:00  --------------------------------------------------------  IN: 2106.5 mL / OUT: 1045 mL / NET: 1061.5 mL    14 Apr 2023 07:01  -  14 Apr 2023 10:40  --------------------------------------------------------  IN: 0 mL / OUT: 150 mL / NET: -150 mL        LABS:                        9.7    13.85 )-----------( 269      ( 14 Apr 2023 06:57 )             29.6     04-14    136  |  102  |  12  ----------------------------<  136<H>  4.2   |  25  |  0.98    Ca    8.2<L>      14 Apr 2023 06:57      PT/INR - ( 13 Apr 2023 07:38 )   PT: 11.5 sec;   INR: 0.97          PTT - ( 13 Apr 2023 07:38 )  PTT:35.7 sec    CAPILLARY BLOOD GLUCOSE            RADIOLOGY & ADDITIONAL STUDIES:

## 2023-04-14 NOTE — PHYSICAL THERAPY INITIAL EVALUATION ADULT - GAIT DEVIATIONS NOTED, PT EVAL
fairly steady gait, no LOB/knee buckling noted; good negotiation through hallway obstacles without gait disturbances noted; verbal cueing to prevent extensive forward flexion to prevent lower back muscle spasm/pain (however still maintaining ARIELLA precautions)/decreased brendon/decreased step length

## 2023-04-14 NOTE — PHYSICAL THERAPY INITIAL EVALUATION ADULT - PRECAUTIONS/LIMITATIONS, REHAB EVAL
n/a
Reviewed/educated patient and  on bilateral mastectomy and ARIELLA precautions; patient verbalized understanding./surgical precautions

## 2023-04-15 RX ADMIN — OXYCODONE HYDROCHLORIDE 5 MILLIGRAM(S): 5 TABLET ORAL at 08:42

## 2023-04-15 RX ADMIN — Medication 975 MILLIGRAM(S): at 04:25

## 2023-04-15 RX ADMIN — OXYCODONE HYDROCHLORIDE 5 MILLIGRAM(S): 5 TABLET ORAL at 09:42

## 2023-04-15 RX ADMIN — ENOXAPARIN SODIUM 40 MILLIGRAM(S): 100 INJECTION SUBCUTANEOUS at 06:19

## 2023-04-15 RX ADMIN — OXYCODONE HYDROCHLORIDE 5 MILLIGRAM(S): 5 TABLET ORAL at 20:55

## 2023-04-15 RX ADMIN — Medication 975 MILLIGRAM(S): at 21:59

## 2023-04-15 RX ADMIN — OXYCODONE HYDROCHLORIDE 5 MILLIGRAM(S): 5 TABLET ORAL at 04:35

## 2023-04-15 RX ADMIN — OXYCODONE HYDROCHLORIDE 5 MILLIGRAM(S): 5 TABLET ORAL at 16:27

## 2023-04-15 RX ADMIN — OXYCODONE HYDROCHLORIDE 5 MILLIGRAM(S): 5 TABLET ORAL at 21:35

## 2023-04-15 RX ADMIN — LAMOTRIGINE 100 MILLIGRAM(S): 25 TABLET, ORALLY DISINTEGRATING ORAL at 22:01

## 2023-04-15 RX ADMIN — OXYCODONE HYDROCHLORIDE 5 MILLIGRAM(S): 5 TABLET ORAL at 05:08

## 2023-04-15 RX ADMIN — Medication 975 MILLIGRAM(S): at 22:40

## 2023-04-15 RX ADMIN — ESCITALOPRAM OXALATE 20 MILLIGRAM(S): 10 TABLET, FILM COATED ORAL at 22:01

## 2023-04-15 RX ADMIN — Medication 975 MILLIGRAM(S): at 13:09

## 2023-04-15 RX ADMIN — OXYCODONE HYDROCHLORIDE 5 MILLIGRAM(S): 5 TABLET ORAL at 17:27

## 2023-04-15 NOTE — PROGRESS NOTE ADULT - ASSESSMENT
47F s/p bilateral mastectomies and bilateral ARIELLA flap reconstruction 4/13/2023. Recovering appropriately.  - q4h flap checks  - regular diet  - dc IVF  - IV abx  - ambulate with assistance  - PRN and standing pain control  - VTE ppx    Willi Tucker PGY6  Plastic Surgery  Pager: [phone]

## 2023-04-15 NOTE — PROGRESS NOTE ADULT - SUBJECTIVE AND OBJECTIVE BOX
Plastic Surgery Progress Note (pg [facility]: [id number], [facility]: [phone])    SUBJECTIVE  The patient was seen and examined this morning during rounds. No acute events overnight. Ambulating, voiding, tolerating diet. Pain controlled.    OBJECTIVE  ___________________________________________________  VITAL SIGNS / I&O's   Vital Signs Last 24 Hrs  T(C): 36.9 (15 Apr 2023 04:30), Max: 37.4 (14 Apr 2023 16:06)  T(F): 98.5 (15 Apr 2023 04:30), Max: 99.4 (14 Apr 2023 16:06)  HR: 76 (15 Apr 2023 04:30) (76 - 98)  BP: 116/75 (15 Apr 2023 04:30) (104/70 - 126/73)  BP(mean): --  RR: 18 (15 Apr 2023 04:30) (17 - 18)  SpO2: 99% (15 Apr 2023 04:30) (95% - 99%)    Parameters below as of 15 Apr 2023 04:30  Patient On (Oxygen Delivery Method): room air          14 Apr 2023 07:01  -  15 Apr 2023 07:00  --------------------------------------------------------  IN:    dextrose 5% + sodium chloride 0.45%: 1600 mL  Total IN: 1600 mL    OUT:    Bulb (mL): 55 mL    Bulb (mL): 65 mL    Bulb (mL): 75 mL    Bulb (mL): 55 mL    Indwelling Catheter - Urethral (mL): 150 mL    Voided (mL): 1550 mL  Total OUT: 1950 mL    Total NET: -350 mL        ___________________________________________________  PHYSICAL EXAM    -- CONSTITUTIONAL: NAD, lying in bed  -- NEURO: Awake, alert  -- PULM: Non-labored respirations  -- BREAST: bilateral reconstructed breasts soft, no collection, incisions c/d/i, ARIELLA flap skin paddles soft, warm, appropriate color, good cap refill, strong Cook doppler signals. Drains serosanguinous.  -- ABDOMEN: soft, non-tender, non-distended, incision c/d/i no collections, drains serosanguinous    ___________________________________________________  LABS                        9.7    13.85 )-----------( 269      ( 14 Apr 2023 06:57 )             29.6     14 Apr 2023 06:57    136    |  102    |  12     ----------------------------<  136    4.2     |  25     |  0.98     Ca    8.2        14 Apr 2023 06:57      ___________________________________________________  MEDICATIONS  (STANDING):  acetaminophen     Tablet .. 975 milliGRAM(s) Oral every 8 hours  enoxaparin Injectable 40 milliGRAM(s) SubCutaneous every 24 hours  escitalopram 20 milliGRAM(s) Oral daily  lamoTRIgine 100 milliGRAM(s) Oral at bedtime  spironolactone 50 milliGRAM(s) Oral daily    MEDICATIONS  (PRN):  diazepam    Tablet 5 milliGRAM(s) Oral every 8 hours PRN muscle spasms  HYDROmorphone  Injectable 0.5 milliGRAM(s) IV Push every 15 minutes PRN breakthrough pain  HYDROmorphone  Injectable 1 milliGRAM(s) IV Push every 4 hours PRN Moderate Pain (4 - 6)  metoclopramide Injectable 10 milliGRAM(s) IV Push every 6 hours PRN Nausea and/or Vomiting  ondansetron Injectable 4 milliGRAM(s) IV Push every 6 hours PRN Nausea  oxyCODONE    IR 5 milliGRAM(s) Oral every 4 hours PRN Moderate Pain (4 - 6)  oxyCODONE    IR 10 milliGRAM(s) Oral every 4 hours PRN Severe Pain (7 - 10)  senna 2 Tablet(s) Oral at bedtime PRN Constipation

## 2023-04-15 NOTE — PROGRESS NOTE ADULT - ASSESSMENT
42F POD1 b/l skin sparing masectomy with skin sparing with reconstruction using ARIELLA free flap. Doing well post op.     Recs:  Assessment:42y Female s/p above procedure    Recommendations:  - Diet per plastics  - Pain/nausea control PRN  - c/w home meds as appropriate  - Monitor JUAN ANTONIO output  - Monitor doppler  - Rest of care per Plastics   42F POD1 b/l skin sparing masectomy with skin sparing with reconstruction using ARIELLA free flap. Doing well post op.     Recs:  Assessment:42y Female s/p above procedure    Recommendations:  - Diet per plastics  - Pain/nausea control PRN, optimize pain control  - c/w home meds as appropriate  - Monitor JUAN ANTONIO output  - Monitor doppler  - Rest of care per Plastics

## 2023-04-15 NOTE — PROGRESS NOTE ADULT - SUBJECTIVE AND OBJECTIVE BOX
Comfortable  AVSS  Anticipated Costa drain output  Bilateral DIEPs vialbe soft w good doppler signals  Native breast skin intact with minor ecchymosis  Abdominal donor site is fine  Stable  Reviewed findings w pt  Care, restrictions and anticipated course outlined  If makes good progress may go home later today or tomorrow  Cont Lovenox and SCDs  Incentive spirometer  Ambulate  Drain care  Doppler checks

## 2023-04-15 NOTE — PROGRESS NOTE ADULT - SUBJECTIVE AND OBJECTIVE BOX
STATUS POST:  4/13: b/l mastectomy w/ARIELLA flap recon     POST OPERATIVE DAY #: 2    SUBJECTIVE:  Patient seen and examined by chief resident and team on AM rounds.     MEDICATIONS  (STANDING):  acetaminophen     Tablet .. 975 milliGRAM(s) Oral every 8 hours  enoxaparin Injectable 40 milliGRAM(s) SubCutaneous every 24 hours  escitalopram 20 milliGRAM(s) Oral daily  lamoTRIgine 100 milliGRAM(s) Oral at bedtime  spironolactone 50 milliGRAM(s) Oral daily    MEDICATIONS  (PRN):  diazepam    Tablet 5 milliGRAM(s) Oral every 8 hours PRN muscle spasms  HYDROmorphone  Injectable 0.5 milliGRAM(s) IV Push every 15 minutes PRN breakthrough pain  HYDROmorphone  Injectable 1 milliGRAM(s) IV Push every 4 hours PRN Moderate Pain (4 - 6)  metoclopramide Injectable 10 milliGRAM(s) IV Push every 6 hours PRN Nausea and/or Vomiting  ondansetron Injectable 4 milliGRAM(s) IV Push every 6 hours PRN Nausea  oxyCODONE    IR 5 milliGRAM(s) Oral every 4 hours PRN Moderate Pain (4 - 6)  oxyCODONE    IR 10 milliGRAM(s) Oral every 4 hours PRN Severe Pain (7 - 10)  senna 2 Tablet(s) Oral at bedtime PRN Constipation      Vital Signs Last 24 Hrs  T(C): 36.9 (15 Apr 2023 04:30), Max: 37.4 (14 Apr 2023 16:06)  T(F): 98.5 (15 Apr 2023 04:30), Max: 99.4 (14 Apr 2023 16:06)  HR: 76 (15 Apr 2023 04:30) (76 - 98)  BP: 116/75 (15 Apr 2023 04:30) (104/70 - 126/73)  BP(mean): --  RR: 18 (15 Apr 2023 04:30) (17 - 18)  SpO2: 99% (15 Apr 2023 04:30) (95% - 99%)    Parameters below as of 15 Apr 2023 04:30  Patient On (Oxygen Delivery Method): room air        PHYSICAL EXAM:  Constitutional: A&Ox3    Breasts:     Respiratory: non labored breathing, no respiratory distress    Cardiovascular: NSR, RRR    Gastrointestinal: soft, NTND abdomen. Incisions c/d/i.     Extremities: (-) edema                  I&O's Detail    14 Apr 2023 07:01  -  15 Apr 2023 07:00  --------------------------------------------------------  IN:    dextrose 5% + sodium chloride 0.45%: 1600 mL  Total IN: 1600 mL    OUT:    Bulb (mL): 55 mL    Bulb (mL): 65 mL    Bulb (mL): 75 mL    Bulb (mL): 55 mL    Indwelling Catheter - Urethral (mL): 150 mL    Voided (mL): 1550 mL  Total OUT: 1950 mL    Total NET: -350 mL          LABS:                        9.7    13.85 )-----------( 269      ( 14 Apr 2023 06:57 )             29.6     04-14    136  |  102  |  12  ----------------------------<  136<H>  4.2   |  25  |  0.98    Ca    8.2<L>      14 Apr 2023 06:57            RADIOLOGY & ADDITIONAL STUDIES: STATUS POST:  4/13: b/l mastectomy w/ARIELLA flap recon     POST OPERATIVE DAY #: 2    SUBJECTIVE:  Patient seen and examined by chief resident and team on AM rounds. Patient reports feeling well, improved from yesterday. Reports still feeling pain, but taking oxycodone, Tylenol. Patient tolerating diet, without nausea or vomiting, no BF yet. OOB and ambulating without difficulty.     MEDICATIONS  (STANDING):  acetaminophen     Tablet .. 975 milliGRAM(s) Oral every 8 hours  enoxaparin Injectable 40 milliGRAM(s) SubCutaneous every 24 hours  escitalopram 20 milliGRAM(s) Oral daily  lamoTRIgine 100 milliGRAM(s) Oral at bedtime  spironolactone 50 milliGRAM(s) Oral daily    MEDICATIONS  (PRN):  diazepam    Tablet 5 milliGRAM(s) Oral every 8 hours PRN muscle spasms  HYDROmorphone  Injectable 0.5 milliGRAM(s) IV Push every 15 minutes PRN breakthrough pain  HYDROmorphone  Injectable 1 milliGRAM(s) IV Push every 4 hours PRN Moderate Pain (4 - 6)  metoclopramide Injectable 10 milliGRAM(s) IV Push every 6 hours PRN Nausea and/or Vomiting  ondansetron Injectable 4 milliGRAM(s) IV Push every 6 hours PRN Nausea  oxyCODONE    IR 5 milliGRAM(s) Oral every 4 hours PRN Moderate Pain (4 - 6)  oxyCODONE    IR 10 milliGRAM(s) Oral every 4 hours PRN Severe Pain (7 - 10)  senna 2 Tablet(s) Oral at bedtime PRN Constipation      Vital Signs Last 24 Hrs  T(C): 36.9 (15 Apr 2023 04:30), Max: 37.4 (14 Apr 2023 16:06)  T(F): 98.5 (15 Apr 2023 04:30), Max: 99.4 (14 Apr 2023 16:06)  HR: 76 (15 Apr 2023 04:30) (76 - 98)  BP: 116/75 (15 Apr 2023 04:30) (104/70 - 126/73)  BP(mean): --  RR: 18 (15 Apr 2023 04:30) (17 - 18)  SpO2: 99% (15 Apr 2023 04:30) (95% - 99%)    Parameters below as of 15 Apr 2023 04:30  Patient On (Oxygen Delivery Method): room air        PHYSICAL EXAM:  Constitutional: A&Ox3    Breasts: Dressings c/d/i. JPx2 serosanguinous.     Respiratory: non labored breathing, no respiratory distress    Cardiovascular: NSR, RRR    Gastrointestinal: soft, ND, mildly and appropriate tenderness around incisions. Incisions c/d/i. JPx2 serosanguinous.     Extremities: (-) edema                  I&O's Detail    14 Apr 2023 07:01  -  15 Apr 2023 07:00  --------------------------------------------------------  IN:    dextrose 5% + sodium chloride 0.45%: 1600 mL  Total IN: 1600 mL    OUT:    Bulb (mL): 55 mL    Bulb (mL): 65 mL    Bulb (mL): 75 mL    Bulb (mL): 55 mL    Indwelling Catheter - Urethral (mL): 150 mL    Voided (mL): 1550 mL  Total OUT: 1950 mL    Total NET: -350 mL          LABS:                        9.7    13.85 )-----------( 269      ( 14 Apr 2023 06:57 )             29.6     04-14    136  |  102  |  12  ----------------------------<  136<H>  4.2   |  25  |  0.98    Ca    8.2<L>      14 Apr 2023 06:57            RADIOLOGY & ADDITIONAL STUDIES:

## 2023-04-16 ENCOUNTER — TRANSCRIPTION ENCOUNTER (OUTPATIENT)
Age: 43
End: 2023-04-16

## 2023-04-16 VITALS
SYSTOLIC BLOOD PRESSURE: 108 MMHG | HEART RATE: 84 BPM | DIASTOLIC BLOOD PRESSURE: 67 MMHG | RESPIRATION RATE: 17 BRPM | TEMPERATURE: 98 F | OXYGEN SATURATION: 97 %

## 2023-04-16 PROCEDURE — 85610 PROTHROMBIN TIME: CPT

## 2023-04-16 PROCEDURE — C1781: CPT

## 2023-04-16 PROCEDURE — 86901 BLOOD TYPING SEROLOGIC RH(D): CPT

## 2023-04-16 PROCEDURE — C1889: CPT

## 2023-04-16 PROCEDURE — 88305 TISSUE EXAM BY PATHOLOGIST: CPT

## 2023-04-16 PROCEDURE — 36415 COLL VENOUS BLD VENIPUNCTURE: CPT

## 2023-04-16 PROCEDURE — 86850 RBC ANTIBODY SCREEN: CPT

## 2023-04-16 PROCEDURE — 97110 THERAPEUTIC EXERCISES: CPT

## 2023-04-16 PROCEDURE — 80048 BASIC METABOLIC PNL TOTAL CA: CPT

## 2023-04-16 PROCEDURE — 88307 TISSUE EXAM BY PATHOLOGIST: CPT

## 2023-04-16 PROCEDURE — 97161 PT EVAL LOW COMPLEX 20 MIN: CPT

## 2023-04-16 PROCEDURE — 88300 SURGICAL PATH GROSS: CPT

## 2023-04-16 PROCEDURE — 85027 COMPLETE CBC AUTOMATED: CPT

## 2023-04-16 PROCEDURE — C1762: CPT

## 2023-04-16 PROCEDURE — 97530 THERAPEUTIC ACTIVITIES: CPT

## 2023-04-16 PROCEDURE — 86900 BLOOD TYPING SEROLOGIC ABO: CPT

## 2023-04-16 PROCEDURE — 85730 THROMBOPLASTIN TIME PARTIAL: CPT

## 2023-04-16 RX ADMIN — Medication 975 MILLIGRAM(S): at 06:07

## 2023-04-16 RX ADMIN — Medication 975 MILLIGRAM(S): at 14:10

## 2023-04-16 RX ADMIN — OXYCODONE HYDROCHLORIDE 5 MILLIGRAM(S): 5 TABLET ORAL at 11:17

## 2023-04-16 RX ADMIN — ENOXAPARIN SODIUM 40 MILLIGRAM(S): 100 INJECTION SUBCUTANEOUS at 06:07

## 2023-04-16 RX ADMIN — OXYCODONE HYDROCHLORIDE 5 MILLIGRAM(S): 5 TABLET ORAL at 12:17

## 2023-04-16 NOTE — PROGRESS NOTE ADULT - SUBJECTIVE AND OBJECTIVE BOX
Plastic Surgery Progress Note    SUBJECTIVE  The patient was seen and examined this morning during rounds. No acute events overnight.    OBJECTIVE  ___________________________________________________  VITAL SIGNS / I&O's   Vital Signs Last 24 Hrs  T(C): 36.8 (16 Apr 2023 04:50), Max: 37.2 (15 Apr 2023 17:20)  T(F): 98.3 (16 Apr 2023 04:50), Max: 99 (15 Apr 2023 17:20)  HR: 73 (16 Apr 2023 04:50) (66 - 84)  BP: 105/66 (16 Apr 2023 04:50) (105/66 - 122/77)  BP(mean): --  RR: 18 (16 Apr 2023 04:50) (17 - 18)  SpO2: 97% (16 Apr 2023 04:50) (96% - 97%)    Parameters below as of 16 Apr 2023 04:50  Patient On (Oxygen Delivery Method): room air          15 Apr 2023 07:01  -  16 Apr 2023 07:00  --------------------------------------------------------  IN:    Oral Fluid: 820 mL  Total IN: 820 mL    OUT:    Bulb (mL): 25 mL    Bulb (mL): 40 mL    Bulb (mL): 55 mL    Bulb (mL): 35 mL    Voided (mL): 2150 mL  Total OUT: 2305 mL    Total NET: -1485 mL        ___________________________________________________  PHYSICAL EXAM    -- CONSTITUTIONAL: NAD, sitting up in chair  -- NEURO: Awake, alert  -- PULM: Non-labored respirations  -- BREAST: bilateral reconstructed breasts soft, no collection, incisions c/d/i, ARIELLA flap skin paddles soft, warm, appropriate color, good cap refill, strong Cook doppler signals. Drains serosanguinous.  -- ABDOMEN: soft, non-tender, non-distended, incision c/d/i no collections, drains serosanguinous    ___________________________________________________  MEDICATIONS  (STANDING):  acetaminophen     Tablet .. 975 milliGRAM(s) Oral every 8 hours  enoxaparin Injectable 40 milliGRAM(s) SubCutaneous every 24 hours  escitalopram 20 milliGRAM(s) Oral daily  lamoTRIgine 100 milliGRAM(s) Oral at bedtime  spironolactone 50 milliGRAM(s) Oral daily    MEDICATIONS  (PRN):  diazepam    Tablet 5 milliGRAM(s) Oral every 8 hours PRN muscle spasms  HYDROmorphone  Injectable 0.5 milliGRAM(s) IV Push every 15 minutes PRN breakthrough pain  HYDROmorphone  Injectable 1 milliGRAM(s) IV Push every 4 hours PRN Moderate Pain (4 - 6)  metoclopramide Injectable 10 milliGRAM(s) IV Push every 6 hours PRN Nausea and/or Vomiting  ondansetron Injectable 4 milliGRAM(s) IV Push every 6 hours PRN Nausea  oxyCODONE    IR 5 milliGRAM(s) Oral every 4 hours PRN Moderate Pain (4 - 6)  oxyCODONE    IR 10 milliGRAM(s) Oral every 4 hours PRN Severe Pain (7 - 10)  senna 2 Tablet(s) Oral at bedtime PRN Constipation

## 2023-04-16 NOTE — DISCHARGE NOTE NURSING/CASE MANAGEMENT/SOCIAL WORK - NSDCPEFALRISK_GEN_ALL_CORE
For information on Fall & Injury Prevention, visit: [email or web address] OR  [email or web address] OR  https://www.cdc.gov/steadi/patient.html

## 2023-04-16 NOTE — PROGRESS NOTE ADULT - ASSESSMENT
47F s/p bilateral mastectomies and bilateral ARIELLA flap reconstruction 4/13/2023. Recovering appropriately.  - q4h flap checks  - regular diet  - IV abx  - ambulate with assistance  - PRN and standing pain control  - VTE ppx  - possible DC today    Willi Tucker PGY6  Plastic Surgery  Pager: [phone]

## 2023-04-16 NOTE — PROGRESS NOTE ADULT - SUBJECTIVE AND OBJECTIVE BOX
SUBJECTIVE:  Patient seen and examined on AM rounds with chief resident. Overnight, no acute events. This AM, patient is feeling well.     MEDICATIONS  (STANDING):  acetaminophen     Tablet .. 975 milliGRAM(s) Oral every 8 hours  enoxaparin Injectable 40 milliGRAM(s) SubCutaneous every 24 hours  escitalopram 20 milliGRAM(s) Oral daily  lamoTRIgine 100 milliGRAM(s) Oral at bedtime  spironolactone 50 milliGRAM(s) Oral daily    MEDICATIONS  (PRN):  diazepam    Tablet 5 milliGRAM(s) Oral every 8 hours PRN muscle spasms  HYDROmorphone  Injectable 0.5 milliGRAM(s) IV Push every 15 minutes PRN breakthrough pain  HYDROmorphone  Injectable 1 milliGRAM(s) IV Push every 4 hours PRN Moderate Pain (4 - 6)  metoclopramide Injectable 10 milliGRAM(s) IV Push every 6 hours PRN Nausea and/or Vomiting  ondansetron Injectable 4 milliGRAM(s) IV Push every 6 hours PRN Nausea  oxyCODONE    IR 5 milliGRAM(s) Oral every 4 hours PRN Moderate Pain (4 - 6)  oxyCODONE    IR 10 milliGRAM(s) Oral every 4 hours PRN Severe Pain (7 - 10)  senna 2 Tablet(s) Oral at bedtime PRN Constipation      Vital Signs Last 24 Hrs  T(C): 36.9 (16 Apr 2023 09:05), Max: 37.1 (15 Apr 2023 20:45)  T(F): 98.4 (16 Apr 2023 09:05), Max: 98.8 (15 Apr 2023 20:45)  HR: 84 (16 Apr 2023 09:05) (73 - 84)  BP: 108/67 (16 Apr 2023 09:05) (105/66 - 118/74)  BP(mean): --  RR: 17 (16 Apr 2023 09:05) (17 - 18)  SpO2: 97% (16 Apr 2023 09:05) (97% - 97%)    Parameters below as of 16 Apr 2023 09:05  Patient On (Oxygen Delivery Method): room air    Physical Exam:  General: NAD, resting comfortably in bed  Breasts: dressing is clean, dry, and intact. x2 JUAN ANTONIO with ss output   Pulmonary: Nonlabored breathing, no respiratory distress  Cardiovascular: NSR  Abdominal: soft, NT/ND. incisions are clean, dry, and intact. x2 JUAN ANTONIO with ss output   Extremities: WWP, normal strength    I&O's Summary    15 Apr 2023 07:01  -  16 Apr 2023 07:00  --------------------------------------------------------  IN: 820 mL / OUT: 2305 mL / NET: -1485 mL    16 Apr 2023 07:01  -  16 Apr 2023 17:57  --------------------------------------------------------  IN: 0 mL / OUT: 750 mL / NET: -750 mL

## 2023-04-16 NOTE — DISCHARGE NOTE NURSING/CASE MANAGEMENT/SOCIAL WORK - PATIENT PORTAL LINK FT
You can access the FollowMyHealth Patient Portal offered by [facility] by registering at the following website: [email or web address]. By joining [facility]’s FollowMyHealth portal, you will also be able to view your health information using other applications (apps) compatible with our system.

## 2023-04-16 NOTE — PROGRESS NOTE ADULT - PROVIDER SPECIALTY LIST ADULT
Plastic Surgery
Surgery
Plastic Surgery
Plastic Surgery
Surgery

## 2023-04-16 NOTE — PROGRESS NOTE ADULT - ASSESSMENT
42F POD1 b/l skin sparing masectomy with skin sparing with reconstruction using ARIELLA free flap. Doing well post op.     Recommendations:  - Diet per plastics  - Pain/nausea control PRN, optimize pain control  - c/w home meds as appropriate  - Monitor JUAN ANTONIO output  - Monitor doppler  - Rest of care per Plastics

## 2023-04-17 LAB — SURGICAL PATHOLOGY STUDY: SIGNIFICANT CHANGE UP

## 2023-04-20 DIAGNOSIS — Z40.01 ENCOUNTER FOR PROPHYLACTIC REMOVAL OF BREAST: ICD-10-CM
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
## 2023-04-20 DIAGNOSIS — Z80.3 FAMILY HISTORY OF MALIGNANT NEOPLASM OF BREAST: ICD-10-CM
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
## 2023-04-20 DIAGNOSIS — S24.8XXA: ICD-10-CM

## 2024-05-19 ENCOUNTER — NON-APPOINTMENT (OUTPATIENT)
Age: 44
End: 2024-05-19

## 2024-10-16 ENCOUNTER — NON-APPOINTMENT (OUTPATIENT)
Age: 44
End: 2024-10-16

## 2024-12-14 ENCOUNTER — NON-APPOINTMENT (OUTPATIENT)
Age: 44
End: 2024-12-14
